# Patient Record
Sex: MALE | Race: WHITE | NOT HISPANIC OR LATINO | Employment: FULL TIME | URBAN - METROPOLITAN AREA
[De-identification: names, ages, dates, MRNs, and addresses within clinical notes are randomized per-mention and may not be internally consistent; named-entity substitution may affect disease eponyms.]

---
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
## 2019-08-07 ENCOUNTER — OFFICE VISIT - HEALTHEAST (OUTPATIENT)
Dept: FAMILY MEDICINE | Facility: CLINIC | Age: 19
End: 2019-08-07

## 2019-08-07 ENCOUNTER — COMMUNICATION - HEALTHEAST (OUTPATIENT)
Dept: TELEHEALTH | Facility: CLINIC | Age: 19
End: 2019-08-07

## 2019-08-07 DIAGNOSIS — Z00.129 ENCOUNTER FOR ROUTINE CHILD HEALTH EXAMINATION WITHOUT ABNORMAL FINDINGS: ICD-10-CM

## 2019-08-07 DIAGNOSIS — Z00.129 WCC (WELL CHILD CHECK): ICD-10-CM

## 2019-08-07 ASSESSMENT — MIFFLIN-ST. JEOR: SCORE: 1971.18

## 2020-01-16 ENCOUNTER — RECORDS - HEALTHEAST (OUTPATIENT)
Dept: ADMINISTRATIVE | Facility: OTHER | Age: 20
End: 2020-01-16

## 2021-05-31 NOTE — PROGRESS NOTES
[facility] Well Child Check    ASSESSMENT & PLAN  Deniz Cano is a 18 y.o. who has normal growth and normal development.    [facility] (well child check)  -     Vision Screening  -     Hearing Screening  -     HPV vaccine 9 valent 2 dose IM (if started before age 15)  -     Meningococcal B (PF)  -     PHQ9 Depression Screen  Was in psychotherapy 6 months ago for depression.  Feeling safe at this time.  Not on medication at this time.  Motivational interviewing was utilized today.  I encouraged him to continue to follow-up with his primary care provider    Return to clinic in 1 year for a Well Child Check or sooner as needed    IMMUNIZATIONS/LABS  Immunizations were reviewed and orders were placed as appropriate.    REFERRALS  Dental:  Recommend routine dental care as appropriate.  Other:  No additional referrals were made at this time.    ANTICIPATORY GUIDANCE  I have reviewed age appropriate anticipatory guidance.    HEALTH HISTORY  Do you have any concerns that you'd like to discuss today?: No concerns       No question data found.    Do you have any significant health concerns in your family history?: Yes: paternal grandfather has diabetes  No family history on file.  Since your last visit, have there been any major changes in your family, such as a move, job change, separation, divorce, or death in the family?: No  Has a lack of transportation kept you from medical appointments?: No    Home  Who lives in your home?:  Paternal Aunt and cousin  Social History     Social History Narrative     Not on file     Do you have any concerns about losing your housing?: No  Is your housing safe and comfortable?: Yes  Do you have any trouble with sleep?:  No    Education  What school do you child attend?:  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 you in?:  university  How do you perform in school (grades, behavior, attention, homework?: pretty good     Eating  Do you eat regular meals including fruits and  vegetables?:  yes  What are you drinking (cow's milk, water, soda, juice, sports drinks, energy drinks, etc)?: water and sports drinks  Have you been worried that you don't have enough food?: No  Do you have concerns about your body or appearance?:  No    Activities  Do you have friends?:  yes  Do you get at least one hour of physical activity per day?:  yes  How many hours a day are you in front of a screen other than for schoolwork (computer, TV, phone)?:  2  What do you do for exercise?:  Football, gym, run  Do you have interest/participate in community activities/volunteers/school sports?:  yes    MENTAL HEALTH SCREENING  No data recorded  No data recorded    VISION/HEARING  Vision: Completed. See Results  Hearing:  Completed. See Results     Hearing Screening    125Hz 250Hz 500Hz 1000Hz 2000Hz 3000Hz 4000Hz 6000Hz 8000Hz   Right ear:   20 20 20 20 20 20    Left ear:   20 20 20 20 20 20       Visual Acuity Screening    Right eye Left eye Both eyes   Without correction: 10/10 10/10 10/10   With correction:      Comments: Passed lens plus      TB Risk Assessment:  The patient and/or parent/guardian answer positive to:  patient and/or parent/guardian answer 'no' to all screening TB questions    Dyslipidemia Risk Screening  Have either of your parents or any of your grandparents had a stroke or heart attack before age 55?: Yes: maternal grand father had stroke or heart attack not sure  Any parents with high cholesterol or currently taking medications to treat?: No     Dental  When was the last time you saw the dentist?: over 12 months ago     Patient Active Problem List   Diagnosis     Eczema     Lymphadenopathy       Drugs  Does the patient use tobacco/alcohol/drugs?:  yes    Safety  Does the patient have any safety concerns (peer or home)?:  no  Does the patient use safety belts, helmets and other safety equipment?:  yes    Sex  Have you ever had sex?:  Have any of your past or current sex partners been infected  "with HIV, bisexual, or injection drug users?: No  Are you having unprotected sex with multiple partners?:  No  Have you or any of your partners ever been treated for a sexually transmitted infections?:  No    MEASUREMENTS  Height:  5' 10.75\" (1.797 m)  Weight: 209 lb (94.8 kg)  BMI: Body mass index is 29.36 kg/m .  Blood Pressure: 126/68  Blood pressure percentiles are not available for patients who are 18 years or older.    PHYSICAL EXAM    Objective:    Physical Exam   Vitals:    08/07/19 1427   BP: 126/68   Pulse: 80      Constitutional: Patient is oriented to person, place, and time. Patient appears well-developed and well-nourished. No distress.   Head: Normocephalic and atraumatic.   Right Ear: External ear normal. Normal TM  Left Ear: External ear normal. Normal TM  Nose: Nose normal.   Mouth/Throat: Oropharynx is clear and moist. No oropharyngeal exudate.   Eyes: Conjunctivae and EOM are normal. Pupils are equal, round, and reactive to light. Right eye exhibits no discharge. Left eye exhibits no discharge. No scleral icterus.   Neck: Neck supple. No JVD present. No tracheal deviation present. No thyromegaly present.   Cardiovascular: Normal rate, regular rhythm, normal heart sounds and intact distal pulses. No murmur heard.   Pulmonary/Chest: Effort normal and breath sounds normal. No stridor. No respiratory distress. Patient has no wheezes, no rales, exhibits no tenderness.   Abdominal: Soft. Bowel sounds are normal. Patient exhibits no distension and no mass. There is no tenderness. There is no rebound and no guarding.   Lymphadenopathy:  Patient has no cervical adenopathy.   Neurological: Patient is alert and oriented to person, place, and time. Patient has normal reflexes. No cranial nerve deficit. Coordination normal.   Skin: Skin is warm and dry. No rash noted. Patient is not diaphoretic. No erythema. No pallor.   Jose V  Symmetrical spine          "

## 2021-06-01 VITALS — WEIGHT: 203 LBS | HEIGHT: 71 IN | BODY MASS INDEX: 28.42 KG/M2

## 2021-06-03 VITALS — HEIGHT: 71 IN | WEIGHT: 209 LBS | BODY MASS INDEX: 29.26 KG/M2

## 2021-06-17 NOTE — PATIENT INSTRUCTIONS - HE
Patient Instructions by Linda Vigil MD at 8/7/2019  2:20 PM     Author: Linda Vigil MD Service: -- Author Type: Physician    Filed: 8/7/2019  2:42 PM Encounter Date: 8/7/2019 Status: Signed    : Linda Vigil MD (Physician)         8/7/2019  Wt Readings from Last 1 Encounters:   08/07/19 209 lb (94.8 kg) (95 %, Z= 1.64)*     * Growth percentiles are based on CDC (Boys, 2-20 Years) data.       Acetaminophen Dosing Instructions  (May take every 4-6 hours)      WEIGHT   AGE Infant/Children's  160mg/5ml Children's   Chewable Tabs  80 mg each Guillermo Strength  Chewable Tabs  160 mg     Milliliter (ml) Soft Chew Tabs Chewable Tabs   6-11 lbs 0-3 months 1.25 ml     12-17 lbs 4-11 months 2.5 ml     18-23 lbs 12-23 months 3.75 ml     24-35 lbs 2-3 years 5 ml 2 tabs    36-47 lbs 4-5 years 7.5 ml 3 tabs    48-59 lbs 6-8 years 10 ml 4 tabs 2 tabs   60-71 lbs 9-10 years 12.5 ml 5 tabs 2.5 tabs   72-95 lbs 11 years 15 ml 6 tabs 3 tabs   96 lbs and over 12 years   4 tabs     Ibuprofen Dosing Instructions- Liquid  (May take every 6-8 hours)      WEIGHT   AGE Concentrated Drops   50 mg/1.25 ml Infant/Children's   100 mg/5ml     Dropperful Milliliter (ml)   12-17 lbs 6- 11 months 1 (1.25 ml)    18-23 lbs 12-23 months 1 1/2 (1.875 ml)    24-35 lbs 2-3 years  5 ml   36-47 lbs 4-5 years  7.5 ml   48-59 lbs 6-8 years  10 ml   60-71 lbs 9-10 years  12.5 ml   72-95 lbs 11 years  15 ml       Ibuprofen Dosing Instructions- Tablets/Caplets  (May take every 6-8 hours)    WEIGHT AGE Children's   Chewable Tabs   50 mg Guillermo Strength   Chewable Tabs   100 mg Guillermo Strength   Caplets    100 mg     Tablet Tablet Caplet   24-35 lbs 2-3 years 2 tabs     36-47 lbs 4-5 years 3 tabs     48-59 lbs 6-8 years 4 tabs 2 tabs 2 caps   60-71 lbs 9-10 years 5 tabs 2.5 tabs 2.5 caps   72-95 lbs 11 years 6 tabs 3 tabs 3 caps         Patient Education             Bright Futures Patient Handout   18 to 21  Year Visits     Your Daily Life    Visit the dentist at least twice a year.    Protect your hearing at work, home, and concerts.    Eat a variety of healthy foods.    Eat breakfast every morning.    Drink plenty of water.    Make sure to get enough calcium.    Have 3 or more servings of low-fat (1%) or fat-free milk and other low-fat dairy products each day.    Aim for 1 hour of vigorous physical activity.    Be proud of yourself when you do something well.  Healthy Behavior Choices    Support friends who choose not to use drugs, alcohol, tobacco, steroids, or diet pills.    If you use drugs or alcohol, you can talk to us about it. We can help you with quitting or cutting down on your use.    Make healthy decisions about your sexual behavior.    If you are sexually active, always practice safe sex. Always use a condom to prevent STIs.    All sexual activity should be something you want. No one should ever force or try to convince you.    Find safe activities at school and in the community. Violence and Injuries    Do not drink and drive or ride in a vehicle with someone who has been using drugs or alcohol.    If you feel unsafe driving or riding with someone, call someone you trust to drive you.    Always wear a seat belt in the car.    Know the rules for safe driving.    Never allow physical harm of yourself or others at home or school.    Always deal with conflict using nonviolence.    Remember that healthy dating relationships are built on respect and that saying no is OK.    Fighting and carrying weapons can be dangerous.  Your Feelings    Figure out healthy ways to deal with stress.    Try your best to solve problems and make decisions on your own.    Most people have daily ups and downs. But if you are feeling sad, depressed, nervous, irritable, hopeless, or angry, talk with me or another health professional.    We understand sexuality is an important part of your development. If you have any questions or  concerns, we are here for you. School and Friends    Take responsibility for being organized enough to succeed in work or school.    Find new activities you enjoy.    Consider volunteering and helping others in the community on an issue that interests or concerns you.    Form healthy friendships and find fun, safe things to do with friends.    As you get older, making and keeping friends is important. You may find that you drift away from some of your old friends--thats normal.    Evaluate your friendships and keep those that are healthy.    It is still important to stay connected with your family.

## 2021-06-18 NOTE — PROGRESS NOTES
[facility] Well Child Check    ASSESSMENT & PLAN  Deniz Cano is a 17 y.o. who has normal growth and normal development., Cleared for all sport.     There are no diagnoses linked to this encounter.    Return to clinic in 1 year for a Well Child Check or sooner as needed    IMMUNIZATIONS/LABS  No immunizations due today.    REFERRALS  Dental:  Recommend routine dental care as appropriate.  Other:  No additional referrals were made at this time.    ANTICIPATORY GUIDANCE  I have reviewed age appropriate anticipatory guidance.    HEALTH HISTORY  Do you have any concerns that you'd like to discuss today?: No concerns       No question data found.    Do you have any significant health concerns in your family history?: No  No family history on file.  Since your last visit, have there been any major changes in your family, such as a move, job change, separation, divorce, or death in the family?: Yes: moved to a new home  Has a lack of transportation kept you from medical appointments?: No    Home  Who lives in your home?:  Mother and stepfather  Social History     Social History Narrative     Do you have any concerns about losing your housing?: No  Is your housing safe and comfortable?: Yes  Do you have any trouble with sleep?:  No    Education  What school do you child attend?:  graduated  What grade are you in?:  college  How do you perform in school (grades, behavior, attention, homework?: average - B average     Eating  Do you eat regular meals including fruits and vegetables?:  yes  What are you drinking (cow's milk, water, soda, juice, sports drinks, energy drinks, etc)?: cow's milk- 1%, water and juice  Have you been worried that you don't have enough food?: No  Do you have concerns about your body or appearance?:  No    Activities  Do you have friends?:  yes  Do you get at least one hour of physical activity per day?:  yes  How many hours a day are you in front of a screen other than for schoolwork (computer,  "TV, phone)?:  3  What do you do for exercise?:  Gym - cardio and weights, backetball  Do you have interest/participate in community activities/volunteers/school sports?:  yes, mission trip, football    MENTAL HEALTH SCREENING  PHQ-2 Total Score: 0 (2018  1:04 PM)  Depression Follow-up Plan: patient follow-up to return when and if necessary (2018  1:04 PM)  No Data Recorded    VISION/HEARING  Vision: Completed. See Results  Hearing  No exam data present    TB Risk Assessment:  Dyslipidemia Risk Screening  Have either of your parents or any of your grandparents had a stroke or heart attack before age 55?: No  Any parents with high cholesterol or currently taking medications to treat?: No     Dental  When was the last time you saw the dentist?: 3-6 months ago       Patient Active Problem List   Diagnosis     Eczema     Lymphadenopathy       Drugs  Does the patient use tobacco/alcohol/drugs?:  no    Safety  Does the patient have any safety concerns (peer or home)?:  no  Does the patient use safety belts, helmets and other safety equipment?:  yes    Sex  Have you ever had sex?:  Yes    MEASUREMENTS  Height:  5' 11\" (1.803 m)  Weight: 203 lb (92.1 kg)  BMI: Body mass index is 28.31 kg/(m^2).  Blood Pressure: 122/62  Blood pressure percentiles are 47 % systolic and 16 % diastolic based on NHBPEP's 4th Report. Blood pressure percentile targets: 90: 137/88, 95: 140/92, 99 + 5 mmH/105.    PHYSICAL EXAM  /62  Pulse 63  Ht 5' 11\" (1.803 m)  Wt 203 lb (92.1 kg)  BMI 28.31 kg/m2  General appearance: alert, appears stated age and cooperative  Head: Normocephalic, without obvious abnormality, atraumatic  Eyes: conjunctivae/corneas clear. PERRL, EOM's intact. Fundi benign.  Ears: normal TM's and external ear canals both ears  Throat: lips, mucosa, and tongue normal; teeth and gums normal  Lungs: clear to auscultation bilaterally  Heart: regular rate and rhythm, S1, S2 normal, no murmur, click, rub or " gallop  Extremities: extremities normal, atraumatic, no cyanosis or edema  Pulses: 2+ and symmetric  Skin: Skin color, texture, turgor normal. No rashes or lesions  Lymph nodes: Cervical, supraclavicular, and axillary nodes normal.  Neurologic: Grossly normal

## 2021-08-15 ENCOUNTER — HEALTH MAINTENANCE LETTER (OUTPATIENT)
Age: 21
End: 2021-08-15

## 2021-10-11 ENCOUNTER — HEALTH MAINTENANCE LETTER (OUTPATIENT)
Age: 21
End: 2021-10-11

## 2022-09-25 ENCOUNTER — HEALTH MAINTENANCE LETTER (OUTPATIENT)
Age: 22
End: 2022-09-25

## 2023-10-14 ENCOUNTER — HEALTH MAINTENANCE LETTER (OUTPATIENT)
Age: 23
End: 2023-10-14

## 2024-09-06 ENCOUNTER — OFFICE VISIT (OUTPATIENT)
Dept: FAMILY MEDICINE | Facility: CLINIC | Age: 24
End: 2024-09-06

## 2024-09-06 VITALS
TEMPERATURE: 97.5 F | BODY MASS INDEX: 29.13 KG/M2 | DIASTOLIC BLOOD PRESSURE: 72 MMHG | WEIGHT: 219.8 LBS | OXYGEN SATURATION: 96 % | SYSTOLIC BLOOD PRESSURE: 124 MMHG | HEART RATE: 57 BPM | HEIGHT: 73 IN

## 2024-09-06 DIAGNOSIS — Z13.1 DIABETES MELLITUS SCREENING: ICD-10-CM

## 2024-09-06 DIAGNOSIS — Z00.00 ROUTINE PHYSICAL EXAMINATION: Primary | ICD-10-CM

## 2024-09-06 DIAGNOSIS — G89.29 CHRONIC RIGHT-SIDED LOW BACK PAIN WITHOUT SCIATICA: ICD-10-CM

## 2024-09-06 DIAGNOSIS — N52.9 ERECTILE DYSFUNCTION, UNSPECIFIED ERECTILE DYSFUNCTION TYPE: ICD-10-CM

## 2024-09-06 DIAGNOSIS — M54.50 CHRONIC RIGHT-SIDED LOW BACK PAIN WITHOUT SCIATICA: ICD-10-CM

## 2024-09-06 DIAGNOSIS — Z13.220 LIPID SCREENING: ICD-10-CM

## 2024-09-06 LAB
ALBUMIN SERPL-MCNC: 4.8 G/DL (ref 3.6–5.1)
ALP SERPL-CCNC: 39 U/L (ref 33–130)
ALT 1742-6: 38 U/L (ref 0–32)
AST 1920-8: 29 U/L (ref 0–35)
BILIRUB SERPL-MCNC: 0.9 MG/DL (ref 0.2–1.2)
BUN SERPL-MCNC: 15 MG/DL (ref 7–25)
BUN/CREATININE RATIO: 12 (ref 6–32)
CALCIUM SERPL-MCNC: 9.7 MG/DL (ref 8.6–10.3)
CHLORIDE SERPLBLD-SCNC: 102 MMOL/L (ref 98–110)
CHOLEST SERPL-MCNC: 183 MG/DL (ref 0–199)
CHOLEST/HDLC SERPL: 3 {RATIO} (ref 0–5)
CO2 SERPL-SCNC: 27 MMOL/L (ref 20–32)
CREAT SERPL-MCNC: 1.22 MG/DL (ref 0.6–1.3)
GLUCOSE SERPL-MCNC: 77 MG/DL (ref 60–99)
HDLC SERPL-MCNC: 64 MG/DL (ref 40–150)
LDLC SERPL CALC-MCNC: 105 MG/DL
POTASSIUM SERPL-SCNC: 4.76 MMOL/L (ref 3.5–5.3)
PROT SERPL-MCNC: 7.6 G/DL (ref 6.1–8.1)
SODIUM SERPL-SCNC: 139.7 MMOL/L (ref 135–146)
TRIGL SERPL-MCNC: 69 MG/DL (ref 0–149)

## 2024-09-06 PROCEDURE — 99212 OFFICE O/P EST SF 10 MIN: CPT | Mod: 25 | Performed by: STUDENT IN AN ORGANIZED HEALTH CARE EDUCATION/TRAINING PROGRAM

## 2024-09-06 PROCEDURE — 36415 COLL VENOUS BLD VENIPUNCTURE: CPT | Performed by: STUDENT IN AN ORGANIZED HEALTH CARE EDUCATION/TRAINING PROGRAM

## 2024-09-06 PROCEDURE — 80061 LIPID PANEL: CPT | Performed by: STUDENT IN AN ORGANIZED HEALTH CARE EDUCATION/TRAINING PROGRAM

## 2024-09-06 PROCEDURE — 72100 X-RAY EXAM L-S SPINE 2/3 VWS: CPT | Performed by: STUDENT IN AN ORGANIZED HEALTH CARE EDUCATION/TRAINING PROGRAM

## 2024-09-06 PROCEDURE — 80053 COMPREHEN METABOLIC PANEL: CPT | Performed by: STUDENT IN AN ORGANIZED HEALTH CARE EDUCATION/TRAINING PROGRAM

## 2024-09-06 PROCEDURE — 99385 PREV VISIT NEW AGE 18-39: CPT | Performed by: STUDENT IN AN ORGANIZED HEALTH CARE EDUCATION/TRAINING PROGRAM

## 2024-09-06 NOTE — PATIENT INSTRUCTIONS
Xrays and blood test before you leave    Physical Therapy  [facility]   [address] [place] [address]  [phone] -- appt line

## 2024-09-06 NOTE — NURSING NOTE
Chief Complaint   Patient presents with    Physical     Annual physical, pt wants to do blood work    Establish Care     New patient     Pre-visit Screening:  Immunizations:  up to date  Colonoscopy:  na  Mammogram: na  Asthma Action Test/Plan:  na  PHQ9:  phq2 given today  GAD7:  phq2 given today  Questioned patient about current smoking habits Pt. has never smoked.  Ok to leave detailed message on voice mail for today's visit only yes, phone # [phone] (home)

## 2024-09-06 NOTE — PROGRESS NOTES
SUBJECTIVE:   CC: Deniz Cano is an 23 year old male who presents for preventive health visit.     Patient has been advised of split billing requirements and indicates understanding: Yes    Nursing Notes:   Malgorzata Merida MA  9/6/2024  9:33 AM  Signed  Chief Complaint   Patient presents with    Physical     Annual physical, pt wants to do blood work    Establish Care     New patient     Pre-visit Screening:  Immunizations:  up to date  Colonoscopy:  na  Mammogram: na  Asthma Action Test/Plan:  na  PHQ9:  phq2 given today  GAD7:  phq2 given today  Questioned patient about current smoking habits Pt. has never smoked.  Ok to leave detailed message on voice mail for today's visit only yes, phone # [phone] (home)        Healthy Habits:  General health: good overall  Exercise: active job, gym 2-4x/wk  Sleep: no concerns   Mental Health: no concerns      Works in [employer] in [place]    Problems taking medications regularly not applicable  Do you see a dentist twice per year? Due, planned  Do you have sleep apnea, excessive snoring or daytime drowsiness?no    Low back pain: on and off since high school, R sided lumbar, no radicular sx, no n/t or focal weakness. Doesn't recall any acute injury. Previously seen by chiropractor in HS.       Today's PHQ-2 Score:       9/6/2024     9:11 AM   PHQ-2 ( 1999 [employer])   Q1: Little interest or pleasure in doing things 0   Q2: Feeling down, depressed or hopeless 0   PHQ-2 Score 0       Do you feel safe in your environment? Yes    Have you ever done Advance Care Planning? (For example, a Health Directive, POLST, or a discussion with a medical provider or your loved ones about your wishes): No, advance care planning information given to patient to review.  Patient declined advance care planning discussion at this time.    Social History     Tobacco Use    Smoking status: Never    Smokeless tobacco: Former   Substance Use Topics    Alcohol use: No  "    If you drink alcohol do you typically have >3 drinks per day or >7 drinks per week? No                      Last PSA: No results found for: \"PSA\"    Reviewed orders with patient. Reviewed health maintenance and updated orders accordingly - Yes  Lab work is in process  Labs reviewed in EPIC  BP Readings from Last 3 Encounters:   09/06/24 124/72    Wt Readings from Last 3 Encounters:   09/06/24 99.7 kg (219 lb 12.8 oz)   08/07/19 94.8 kg (209 lb) (95%, Z= 1.64)*   06/26/18 92.1 kg (203 lb) (94%, Z= 1.59)*     * Growth percentiles are based on CDC (Boys, 2-20 Years) data.                    Reviewed and updated as needed this visit by clinical staff   Tobacco  Allergies  Meds              Reviewed and updated as needed this visit by Provider                  Past Medical History:   Diagnosis Date    Wrist laceration       Past Surgical History:   Procedure Laterality Date    FINGER SURGERY       Family History   Problem Relation Age of Onset    No Known Problems Mother     Obesity Father     Diabetes Father     No Known Problems Brother     Cerebrovascular Disease Paternal Grandmother     Coronary Artery Disease No family hx of     Colon Cancer No family hx of     Cancer No family hx of     Prostate Cancer No family hx of     Skin Cancer No family hx of        ROS:  12 point ROS performed and negative for new concerns except as mentioned above     OBJECTIVE:   /72 (BP Location: Right arm, Patient Position: Sitting, Cuff Size: Adult Large)   Pulse 57   Temp 97.5  F (36.4  C) (Temporal)   Ht 1.842 m (6' 0.5\")   Wt 99.7 kg (219 lb 12.8 oz)   SpO2 96%   BMI 29.40 kg/m    EXAM:  GENERAL: alert and no distress  EYES: Eyes grossly normal to inspection, PERRL and conjunctivae and sclerae normal  HENT: ear canals and TM's normal, nose and mouth without ulcers or lesions  NECK: no adenopathy, no asymmetry, masses, or scars  RESP: lungs clear to auscultation - no rales, rhonchi or wheezes  CV: regular rate and " rhythm, normal S1 S2, no S3 or S4, no murmur, click or rub, no peripheral edema  ABDOMEN: soft, nontender, no hepatosplenomegaly, no masses and bowel sounds normal   (male): normal male genitalia without lesions or urethral discharge, no hernia  MS: no gross musculoskeletal defects noted, no edema  SKIN: no suspicious lesions or rashes  NEURO: Normal strength and tone, mentation intact and speech normal  PSYCH: mentation appears normal, affect normal/bright    Lumbar: full ROM, no midline TTP or stepoff, nt SI jts, Str and sens intact throughout BLE, normal gait    Diagnostic Test Results:  Results for orders placed or performed in visit on 09/06/24   XR Lumbar Spine 2/3 Views     Status: None    Narrative    Radiologist Consultation/:   Fax:  [phone]  [phone]  _____________________________________________________________________________________________________________________________________________________________________________________________________________________________________________________________________________________________________________________________________________________________________________________________________________________________________________________________________________________________________  PATIENT NAME: BENTSON, EDUARD A  YOB: 2000 Age: 23 ACCESSION NUMBER: [id number]  SEX: M ORDERING PROVIDER: Eliseo Ishmael  FACILITY: [facility] PRIMARY PROVIDER:  PATIENT ID: [id number] INTERESTED PARTY:  Page 1 of 1  _________________________________________________________________________________________________________________________________________________________________________________________________________________________________________________________________________________________________________________________________________________________________________________________  EXAM: XRAY LUMBAR  SPINE-2 OR 3 VIEW  LOCATION: [facility]  DATE: 9/6/2024  INDICATION: Chronic right sided low back pain without sciatica  COMPARISON: None.  IMPRESSION: No fracture. Normal vertebral heights and alignment. Normal disc spaces and facets for age.  Normal extraspinal structures.  SIGNED BY: Sheri Archer MD 9/6/2024 8:57 PM   Comprehensive Metobolic Panel (BFP)     Status: Abnormal   Result Value Ref Range    Carbon Dioxide 27.0 20 - 32 mmol/L    Creatinine 1.22 0.60 - 1.30 mg/dL    Glucose 77 60 - 99 mg/dL    Sodium 139.7 135 - 146 mmol/L    Potassium 4.76 3.5 - 5.3 mmol/L    Chloride 102.0 98 - 110 mmol/L    Protein Total 7.6 6.1 - 8.1 g/dL    Albumin 4.8 3.6 - 5.1 g/dL    Alkaline Phosphatase 39 33 - 130 U/L    ALT 38 (A) 0 - 32 U/L    AST 29 0 - 35 U/L    Bilirubin Total 0.9 0.2 - 1.2 mg/dL    Urea Nitrogen 15 7 - 25 mg/dL    Calcium 9.7 8.6 - 10.3 mg/dL    BUN/Creatinine Ratio 12 6 - 32   Lipid Panel (BFP)     Status: None   Result Value Ref Range    Cholesterol 183 0 - 199 mg/dL    Triglycerides 69 0 - 149 mg/dL    HDL Cholesterol 64 40 - 150 mg/dL    LDL-C 105 mg/dL    Cholesterol/HDL Ratio 3 0 - 5      ASSESSMENT/PLAN:       ICD-10-CM    1. Routine physical examination  Z00.00       2. Lipid screening  Z13.220 Lipid Panel (BFP)      3. Diabetes mellitus screening  Z13.1 Comprehensive Metobolic Panel (BFP)      4. Chronic right-sided low back pain without sciatica  M54.50 XR Lumbar Spine 2/3 Views    G89.29 Physical Therapy  Referral - To a [facility] Location (Use POS/Location)      5. Erectile dysfunction, unspecified erectile dysfunction type  N52.9 Testosterone Free and Total ([facility])           Patient has been advised of split billing requirements and indicates understanding: Yes  Low back pain: no red flags on hx or exam, XRs obtained and reviewed today. Reviewed nature of LBP and tx options, recommend PT.     COUNSELING:  Reviewed preventive health counseling, as  "reflected in patient instructions  Special attention given to:       Consider AAA screening for ages 65-75 and > 100 cig smoking history or family history of AAA       Regular exercise       Healthy diet/nutrition       Vision screening       Immunizations        Alcohol Use        Safe sex practices/STD prevention       Colorectal cancer screening       Prostate cancer screening    Estimated body mass index is 29.4 kg/m  as calculated from the following:    Height as of this encounter: 1.842 m (6' 0.5\").    Weight as of this encounter: 99.7 kg (219 lb 12.8 oz).    He reports that he has never smoked. He has quit using smokeless tobacco.      Patient Instructions   Xrays and blood test before you leave    Physical Therapy  [facility]   [address] [place] [id number]  [phone] -- appt line       Eliseo Quiñones MD, [facility]   "

## 2024-09-11 LAB
TESTOSTERONE FREE LC/MS/MS - QUEST: 91.9 PG/ML (ref 35–155)
TESTOSTERONE, TOTAL, LC/MS/MS-QUEST: 329 NG/DL (ref 250–1100)

## 2025-01-10 ENCOUNTER — HOSPITAL ENCOUNTER (EMERGENCY)
Facility: CLINIC | Age: 25
Discharge: HOME | End: 2025-01-10
Attending: EMERGENCY MEDICINE | Admitting: EMERGENCY MEDICINE
Payer: COMMERCIAL

## 2025-01-10 ENCOUNTER — APPOINTMENT (OUTPATIENT)
Dept: GENERAL RADIOLOGY | Facility: CLINIC | Age: 25
End: 2025-01-10
Attending: EMERGENCY MEDICINE
Payer: COMMERCIAL

## 2025-01-10 VITALS
DIASTOLIC BLOOD PRESSURE: 74 MMHG | HEART RATE: 63 BPM | HEIGHT: 71 IN | BODY MASS INDEX: 32.87 KG/M2 | TEMPERATURE: 97.9 F | OXYGEN SATURATION: 97 % | SYSTOLIC BLOOD PRESSURE: 121 MMHG | WEIGHT: 234.79 LBS | RESPIRATION RATE: 16 BRPM

## 2025-01-10 DIAGNOSIS — R94.31 SHORTENED PR INTERVAL: ICD-10-CM

## 2025-01-10 DIAGNOSIS — R07.9 CHEST PAIN, UNSPECIFIED TYPE: ICD-10-CM

## 2025-01-10 DIAGNOSIS — J40 BRONCHITIS: ICD-10-CM

## 2025-01-10 LAB
ANION GAP SERPL CALCULATED.3IONS-SCNC: 14 MMOL/L (ref 7–15)
ATRIAL RATE - MUSE: 71 BPM
BASOPHILS # BLD AUTO: 0 10E3/UL (ref 0–0.2)
BASOPHILS NFR BLD AUTO: 0 %
BUN SERPL-MCNC: 19.8 MG/DL (ref 6–20)
CALCIUM SERPL-MCNC: 9.5 MG/DL (ref 8.8–10.4)
CHLORIDE SERPL-SCNC: 101 MMOL/L (ref 98–107)
CREAT SERPL-MCNC: 1.06 MG/DL (ref 0.67–1.17)
DIASTOLIC BLOOD PRESSURE - MUSE: NORMAL MMHG
EGFRCR SERPLBLD CKD-EPI 2021: >90 ML/MIN/1.73M2
EOSINOPHIL # BLD AUTO: 0.1 10E3/UL (ref 0–0.7)
EOSINOPHIL NFR BLD AUTO: 1 %
ERYTHROCYTE [DISTWIDTH] IN BLOOD BY AUTOMATED COUNT: 12.5 % (ref 10–15)
FLUAV RNA SPEC QL NAA+PROBE: NEGATIVE
FLUBV RNA RESP QL NAA+PROBE: NEGATIVE
GLUCOSE SERPL-MCNC: 132 MG/DL (ref 70–99)
HCO3 SERPL-SCNC: 22 MMOL/L (ref 22–29)
HCT VFR BLD AUTO: 40.8 % (ref 40–53)
HGB BLD-MCNC: 13.6 G/DL (ref 13.3–17.7)
IMM GRANULOCYTES # BLD: 0 10E3/UL
IMM GRANULOCYTES NFR BLD: 0 %
INTERPRETATION ECG - MUSE: NORMAL
LYMPHOCYTES # BLD AUTO: 1.6 10E3/UL (ref 0.8–5.3)
LYMPHOCYTES NFR BLD AUTO: 15 %
MCH RBC QN AUTO: 30.4 PG (ref 26.5–33)
MCHC RBC AUTO-ENTMCNC: 33.3 G/DL (ref 31.5–36.5)
MCV RBC AUTO: 91 FL (ref 78–100)
MONOCYTES # BLD AUTO: 1 10E3/UL (ref 0–1.3)
MONOCYTES NFR BLD AUTO: 9 %
NEUTROPHILS # BLD AUTO: 7.9 10E3/UL (ref 1.6–8.3)
NEUTROPHILS NFR BLD AUTO: 74 %
NRBC # BLD AUTO: 0 10E3/UL
NRBC BLD AUTO-RTO: 0 /100
P AXIS - MUSE: 28 DEGREES
PLATELET # BLD AUTO: 260 10E3/UL (ref 150–450)
POTASSIUM SERPL-SCNC: 4.1 MMOL/L (ref 3.4–5.3)
PR INTERVAL - MUSE: 108 MS
QRS DURATION - MUSE: 88 MS
QT - MUSE: 408 MS
QTC - MUSE: 443 MS
R AXIS - MUSE: 26 DEGREES
RBC # BLD AUTO: 4.48 10E6/UL (ref 4.4–5.9)
RSV RNA SPEC NAA+PROBE: NEGATIVE
SARS-COV-2 RNA RESP QL NAA+PROBE: NEGATIVE
SODIUM SERPL-SCNC: 137 MMOL/L (ref 135–145)
SYSTOLIC BLOOD PRESSURE - MUSE: NORMAL MMHG
T AXIS - MUSE: 54 DEGREES
TROPONIN T SERPL HS-MCNC: <6 NG/L
VENTRICULAR RATE- MUSE: 71 BPM
WBC # BLD AUTO: 10.6 10E3/UL (ref 4–11)

## 2025-01-10 PROCEDURE — 82374 ASSAY BLOOD CARBON DIOXIDE: CPT | Performed by: EMERGENCY MEDICINE

## 2025-01-10 PROCEDURE — 36415 COLL VENOUS BLD VENIPUNCTURE: CPT | Performed by: EMERGENCY MEDICINE

## 2025-01-10 PROCEDURE — 80048 BASIC METABOLIC PNL TOTAL CA: CPT | Performed by: EMERGENCY MEDICINE

## 2025-01-10 PROCEDURE — 82565 ASSAY OF CREATININE: CPT | Performed by: EMERGENCY MEDICINE

## 2025-01-10 PROCEDURE — 99285 EMERGENCY DEPT VISIT HI MDM: CPT | Mod: 25

## 2025-01-10 PROCEDURE — 85025 COMPLETE CBC W/AUTO DIFF WBC: CPT | Performed by: EMERGENCY MEDICINE

## 2025-01-10 PROCEDURE — 93005 ELECTROCARDIOGRAM TRACING: CPT

## 2025-01-10 PROCEDURE — 71046 X-RAY EXAM CHEST 2 VIEWS: CPT

## 2025-01-10 PROCEDURE — 87637 SARSCOV2&INF A&B&RSV AMP PRB: CPT | Performed by: EMERGENCY MEDICINE

## 2025-01-10 PROCEDURE — 84484 ASSAY OF TROPONIN QUANT: CPT | Performed by: EMERGENCY MEDICINE

## 2025-01-10 ASSESSMENT — COLUMBIA-SUICIDE SEVERITY RATING SCALE - C-SSRS
1. IN THE PAST MONTH, HAVE YOU WISHED YOU WERE DEAD OR WISHED YOU COULD GO TO SLEEP AND NOT WAKE UP?: NO
6. HAVE YOU EVER DONE ANYTHING, STARTED TO DO ANYTHING, OR PREPARED TO DO ANYTHING TO END YOUR LIFE?: NO
2. HAVE YOU ACTUALLY HAD ANY THOUGHTS OF KILLING YOURSELF IN THE PAST MONTH?: NO

## 2025-01-10 ASSESSMENT — ACTIVITIES OF DAILY LIVING (ADL)
ADLS_ACUITY_SCORE: 41
ADLS_ACUITY_SCORE: 41

## 2025-01-10 NOTE — DISCHARGE INSTRUCTIONS
Please make an appointment to follow up with your primary care provider in 3-5 days even if entirely better.    You may take ibuprofen 800 mg 3 times a day as needed for pain.    I have placed an outpatient follow-up order with cardiology as your EKG had some and called a short AL interval and findings that could represent something called Yessi-Parkinson-White.  If you develop any sense of your heart is racing or passout please come to the ER.    Discharge Instructions  Chest Pain    You have been seen today for chest pain or discomfort.  At this time, your provider has found no signs that your chest pain is due to a serious or life-threatening condition, (or you have declined more testing and/or admission to the hospital). However, sometimes there is a serious problem that does not show up right away. Your evaluation today may not be complete and you may need further testing and evaluation.     Generally, every Emergency Department visit should have a follow-up clinic visit with either a primary or a specialty clinic/provider. Please follow-up as instructed by your emergency provider today.  Return to the Emergency Department if:  Your chest pain changes, gets worse, starts to happen more often, or comes with less activity.  You are newly short of breath.  You get very weak or tired.  You pass out or faint.  You have any new symptoms, like fever, cough, numb legs, or you cough up blood.  You have anything else that worries you.    Until you follow-up with your regular provider, please do the following:  Take one aspirin daily unless you have an allergy or are told not to by your provider.  If a stress test appointment has been made, go to the appointment.  If you have questions, contact your regular provider.  Follow-up with your regular provider/clinic as directed; this is very important.    If you were given a prescription for medicine here today, be sure to read all of the information (including the package  insert) that comes with your prescription.  This will include important information about the medicine, its side effects, and any warnings that you need to know about.  The pharmacist who fills the prescription can provide more information and answer questions you may have about the medicine.  If you have questions or concerns that the pharmacist cannot address, please call or return to the Emergency Department.       Remember that you can always come back to the Emergency Department if you are not able to see your regular provider in the amount of time listed above, if you get any new symptoms, or if there is anything that worries you.  Discharge Instructions  Bronchitis, Pneumonia, Bronchospasm    You were seen today for a chest infection or inflammation. If your provider decided this was due to a bacterial infection, you may need an antibiotic. Sometimes these are caused by a virus, and then an antibiotic will not help.     Generally, every Emergency Department visit should have a follow-up clinic visit with either a primary or a specialty clinic/provider. Please follow-up as instructed by your emergency provider today.    Return to the Emergency Department if:  Your breathing gets much worse.  You are very weak, or feel much more ill.  You develop new symptoms, such as chest pain.  You cough up blood.  You are vomiting (throwing up) enough that you cannot keep fluids or your medicine down.    What can I do to help myself?  Fill any prescriptions the provider gave you and take them right away--especially antibiotics. Be sure to finish the whole antibiotic prescription.  You may be given a prescription for an inhaler, which can help loosen tight air passages.  Use this as needed, but not more often than directed. Inhalers work much better when used with a spacer.   You may be given a prescription for a steroid to reduce inflammation. Used long-term, these can have side effects, but for short-term use they are  safe. You may notice restlessness or increased appetite.      You may use non-prescription cough or cold medicines. Cough medicines may help, but don t make the cough go away completely.   Avoid smoke, because this can make your symptoms worse. If you smoke, this may be a good time to quit! Consider using nicotine lozenges, gum, or patches to reduce cravings.   If you have a fever, Tylenol  (acetaminophen), Motrin  (ibuprofen), or Advil  (ibuprofen) may help bring fever down and may help you feel more comfortable. Be sure to read and follow the package directions, and ask your provider if you have questions.  Be sure to get your flu shot each year.  For certain ages, the pneumonia shot can help prevent pneumonia.  If you were given a prescription for medicine here today, be sure to read all of the information (including the package insert) that comes with your prescription.  This will include important information about the medicine, its side effects, and any warnings that you need to know about.  The pharmacist who fills the prescription can provide more information and answer questions you may have about the medicine.  If you have questions or concerns that the pharmacist cannot address, please call or return to the Emergency Department.     Remember that you can always come back to the Emergency Department if you are not able to see your regular provider in the amount of time listed above, if you get any new symptoms, or if there is anything that worries you.

## 2025-01-10 NOTE — ED TRIAGE NOTES
Acute bronchitis and sinus infection from UR recently - abx given and started yesterday. Doctor on demand gave albuterol and prednisone. S.O. concerned that SpO2 drops to 92% while sleeping. Midsternal CP r/t cough. S.O. diagnosed with flu A 2 weeks ago and pt had same symptoms at that time.

## 2025-01-10 NOTE — ED PROVIDER NOTES
"  Emergency Department Note      History of Present Illness     Chief Complaint   Chest Pain      HPI     Deniz Cano is a 24 year old male presents with cough and chest pain.  Patient reports he went to the urgency room on 1/4 for cough and difficulty breathing.  He was diagnosed with bronchitis and sinusitis.  He was placed on benzo night.  Symptoms did not improve thus did a \" On Demand\" and was given albuterol and prednisone.  Symptoms have not improved.  He continues to have midsternal chest pain that is constant aggravated by cough.  There is no significant shortness of breath in the absence of cough.  He has no underlying history of lung disease.  No history of PE, DVT, unilateral leg swelling, recent immobilization, hemoptysis or malignancy.  He denies any history of syncope.    Independent Historian   Wife is present and states that she was recently diagnosed with influenza A.  She reports she has never seen in the sick.    Review of External Notes   None    Past Medical History     Medical History and Problem List   None    Medications   No current outpatient medications on file.      Surgical History   Past Surgical History:   Procedure Laterality Date    FINGER SURGERY         Physical Exam     Patient Vitals for the past 24 hrs:   BP Temp Temp src Pulse Resp SpO2 Height Weight   01/10/25 0231 121/74 -- -- 63 16 97 % -- --   01/10/25 0047 152/83 97.9  F (36.6  C) Oral 68 16 100 % 1.803 m (5' 11\") 106.5 kg (234 lb 12.6 oz)     Physical Exam      HEENT:    Oropharynx is moist, without lesions or trismus.  Eyes:    Conjunctiva normal  Neck:     Supple, no meningismus.     CV:     Regular rate and rhythm.      No murmurs, rubs or gallops.       No unilateral leg swelling.       2+ radial pulses bilateral.       No lower extremity edema.  PULM:    Clear to auscultation bilateral.       No respiratory distress.      Good air exchange.     No rales or wheezing.     No stridor.  ABD:    Soft, non-tender, " non-distended.       No pulsatile masses.       No rebound, guarding or rigidity.  MSK:     No gross deformity to all four extremities.   LYMPH:   No cervical lymphadenopathy.  NEURO:   Alert. Good muscle tone, no atrophy.  Skin:    Warm, dry and intact.    Psych:    Mood is good and affect is appropriate.      Diagnostics     Lab Results   Labs Ordered and Resulted from Time of ED Arrival to Time of ED Departure   BASIC METABOLIC PANEL - Abnormal       Result Value    Sodium 137      Potassium 4.1      Chloride 101      Carbon Dioxide (CO2) 22      Anion Gap 14      Urea Nitrogen 19.8      Creatinine 1.06      GFR Estimate >90      Calcium 9.5      Glucose 132 (*)    TROPONIN T, HIGH SENSITIVITY - Normal    Troponin T, High Sensitivity <6     INFLUENZA A/B, RSV AND SARS-COV2 PCR - Normal    Influenza A PCR Negative      Influenza B PCR Negative      RSV PCR Negative      SARS CoV2 PCR Negative     CBC WITH PLATELETS AND DIFFERENTIAL    WBC Count 10.6      RBC Count 4.48      Hemoglobin 13.6      Hematocrit 40.8      MCV 91      MCH 30.4      MCHC 33.3      RDW 12.5      Platelet Count 260      % Neutrophils 74      % Lymphocytes 15      % Monocytes 9      % Eosinophils 1      % Basophils 0      % Immature Granulocytes 0      NRBCs per 100 WBC 0      Absolute Neutrophils 7.9      Absolute Lymphocytes 1.6      Absolute Monocytes 1.0      Absolute Eosinophils 0.1      Absolute Basophils 0.0      Absolute Immature Granulocytes 0.0      Absolute NRBCs 0.0         Imaging   Chest XR,  PA & LAT   Final Result   IMPRESSION:       Heart size is normal. Lungs are clear bilaterally. Mediastinum and visualized bony structures are unremarkable.           EKG   ECG results from 01/10/25   EKG 12-lead, tracing only     Value    Systolic Blood Pressure     Diastolic Blood Pressure     Ventricular Rate 71    Atrial Rate 71    WY Interval 108    QRS Duration 88        QTc 443    P Axis 28    R AXIS 26    T Axis 54     Interpretation ECG      Sinus rhythm with short NV  Otherwise normal ECG  No previous ECGs available         Independent Interpretation   CXR: No pneumothorax or infiltrate.    ED Course      Medications Administered   Medications - No data to display    Procedures   Procedures     Discussion of Management   None    ED Course        Additional Documentation  None    Medical Decision Making / Diagnosis     CMS Diagnoses: None    MIPS       None    [facility]   Deniz Cano is a 24 year old male presents with persistent cough and secondary development of chest pain.  Viral swabs negative.  No evidence of acute bronchospasm.  Chest x-ray negative for pneumonia.  Evaluation consistent with viral bronchitis.  Supportive measures indicated.  In regards to his chest pain, EKG without ischemic changes or dysrhythmia.  Troponin within normal limits.  No features concerning for pulmonary embolus, aorta dissection or aortic aneurysm.  I am primarily suspicious for costochondritis or pleurisy.  Ibuprofen and Tylenol as needed for pain.    On EKG he was noted to have a short NV interval.  In V2-3, there is a questionable delta wave.  He denies any palpitations, syncope recently or historically.  I will have patient follow-up with cardiology to ensure they do not have any further concerns for potential WPW.  No indication for admission given lack of historical features to suggest transient dysrhythmia.    Disposition   The patient was discharged.     Diagnosis     ICD-10-CM    1. Shortened NV interval  R94.31 Follow-Up with Cardiology [facility]      2. Chest pain, unspecified type  R07.9       3. Bronchitis  J40            Discharge Medications   There are no discharge medications for this patient.        MD Rakesh Ramirez Jeremiah R, MD  01/10/25 1136

## 2025-01-24 ENCOUNTER — ORDERS ONLY (AUTO-RELEASED) (OUTPATIENT)
Dept: CARDIOLOGY | Facility: CLINIC | Age: 25
End: 2025-01-24

## 2025-01-24 DIAGNOSIS — R94.31 NONSPECIFIC ABNORMAL ELECTROCARDIOGRAM (ECG) (EKG): ICD-10-CM

## 2025-02-07 ENCOUNTER — HOSPITAL ENCOUNTER (OUTPATIENT)
Dept: CARDIOLOGY | Facility: CLINIC | Age: 25
Discharge: HOME | End: 2025-02-07
Attending: INTERNAL MEDICINE | Admitting: INTERNAL MEDICINE
Payer: COMMERCIAL

## 2025-02-07 DIAGNOSIS — R94.31 NONSPECIFIC ABNORMAL ELECTROCARDIOGRAM (ECG) (EKG): ICD-10-CM

## 2025-02-07 PROCEDURE — 93306 TTE W/DOPPLER COMPLETE: CPT

## 2025-02-07 PROCEDURE — 93306 TTE W/DOPPLER COMPLETE: CPT | Mod: 26 | Performed by: INTERNAL MEDICINE
